# Patient Record
Sex: MALE | Race: WHITE | NOT HISPANIC OR LATINO | Employment: UNEMPLOYED | URBAN - METROPOLITAN AREA
[De-identification: names, ages, dates, MRNs, and addresses within clinical notes are randomized per-mention and may not be internally consistent; named-entity substitution may affect disease eponyms.]

---

## 2017-01-19 ENCOUNTER — OFFICE VISIT (OUTPATIENT)
Dept: FAMILY MEDICINE CLINIC | Facility: CLINIC | Age: 57
End: 2017-01-19

## 2017-01-19 VITALS
TEMPERATURE: 97.9 F | DIASTOLIC BLOOD PRESSURE: 70 MMHG | HEIGHT: 75 IN | SYSTOLIC BLOOD PRESSURE: 130 MMHG | WEIGHT: 295 LBS | OXYGEN SATURATION: 98 % | HEART RATE: 87 BPM | BODY MASS INDEX: 36.68 KG/M2

## 2017-01-19 DIAGNOSIS — E10.21 TYPE 1 DIABETES MELLITUS WITH NEPHROPATHY (HCC): ICD-10-CM

## 2017-01-19 DIAGNOSIS — J06.9 URI, ACUTE: ICD-10-CM

## 2017-01-19 DIAGNOSIS — R05.9 COUGH: Primary | ICD-10-CM

## 2017-01-19 DIAGNOSIS — J06.9 VIRAL UPPER RESPIRATORY INFECTION: ICD-10-CM

## 2017-01-19 DIAGNOSIS — D17.0 LIPOMA OF SCALP: ICD-10-CM

## 2017-01-19 LAB
EXPIRATION DATE: NORMAL
FLUAV AG NPH QL: NEGATIVE
FLUBV AG NPH QL: NEGATIVE
INTERNAL CONTROL: NORMAL
Lab: NORMAL

## 2017-01-19 PROCEDURE — 87804 INFLUENZA ASSAY W/OPTIC: CPT | Performed by: GENERAL PRACTICE

## 2017-01-19 PROCEDURE — 99213 OFFICE O/P EST LOW 20 MIN: CPT | Performed by: GENERAL PRACTICE

## 2017-01-19 NOTE — MR AVS SNAPSHOT
Gigi Chew   1/19/2017 3:40 PM   Office Visit    Dept Phone:  [phone]   Encounter #:  [id number]    Provider:  Oneal Mabry MD   Department:  [facility]                Your Full Care Plan              Your Updated Medication List          This list is accurate as of: 1/19/17  4:21 PM.  Always use your most recent med list.                albuterol 108 (90 BASE) MCG/ACT inhaler   Commonly known as:  PROVENTIL HFA;VENTOLIN HFA       atorvastatin 40 MG tablet   Commonly known as:  LIPITOR   Take 1 tablet (40 mg total) by mouth daily.       budesonide-formoterol 160-4.5 MCG/ACT inhaler   Commonly known as:  SYMBICORT       clopidogrel 75 MG tablet   Commonly known as:  PLAVIX   Take 1 tablet by mouth Daily.       glucose blood test strip       hydrochlorothiazide 12.5 MG capsule   Commonly known as:  MICROZIDE   Take 1 capsule by mouth Every Morning.       lisinopril 40 MG tablet   Commonly known as:  PRINIVIL,ZESTRIL   Take 1 tablet by mouth Daily.       NOVOLOG FLEXPEN 100 UNIT/ML solution pen-injector sc pen   Generic drug:  insulin aspart       TOUJEO SOLOSTAR 300 UNIT/ML solution pen-injector   Generic drug:  Insulin Glargine               We Performed the Following     POC Influenza A / B       You Were Diagnosed With        Codes Comments    Cough    -  Primary ICD-10-CM: R05  ICD-9-CM: 786.2     URI, acute     ICD-10-CM: J06.9  ICD-9-CM: 465.9       Instructions     None    Patient Instructions History      Upcoming Appointments     Visit Type Date Time Department    OFFICE VISIT 1/19/2017  3:40 PM K JIM MERLOS      [employer] Signup     [facility] [employer] allows you to send messages to your doctor, view your test results, renew your prescriptions, schedule appointments, and more. To sign up, go to [employer] and click on the Sign Up Now link in the New User? box. Enter your [employer] Activation Code exactly as it  "appears below along with the last four digits of your Social Security Number and your Date of Birth () to complete the sign-up process. If you do not sign up before the expiration date, you must request a new code.    [employer] Activation Code: [email or web address]  Expires: 2017  4:21 PM    If you have questions, you can email "[email or web address] or call [phone] to talk to our [employer] staff. Remember, [employer] is NOT to be used for urgent needs. For medical emergencies, dial 911.               Other Info from Your Visit           Allergies     No Known Allergies      Reason for Visit     Follow-up HTN Diabetic hpl     Cough     Anorexia     URI     Fatigue           Vital Signs     Blood Pressure Pulse Temperature Height Weight Oxygen Saturation    130/70 87 97.9 °F (36.6 °C) 75\" (190.5 cm) 295 lb (134 kg) 98%    Body Mass Index Smoking Status                36.87 kg/m2 Current Every Day Smoker          Problems and Diagnoses Noted     Cough    -  Primary    URI, acute          Results     POC Influenza A / B      Component Value Standard Range & Units    Rapid Influenza A Ag Negative     Rapid Influenza B Ag Negative     Internal Control Passed Passed    Lot Number [id number]     Expiration Date 2017                     "

## 2017-01-19 NOTE — PROGRESS NOTES
Subjective   Gigi Chew is a 56 y.o. male.     Chief Complaint   Patient presents with   • Follow-up     HTN Diabetic hpl    • Cough   • Anorexia   • URI   • Fatigue       History of Present Illness patient is a 56 y.o. very nice  gentleman known diabetic who sees diabetic specialist who is here today with a upper respiratory like illness started I will sore throat on the next day pretty much followed what we have been saying that has been a viral upper respiratory infection and we will check him for the flu.  He also has a lump on the back of his head that he says is been there for a long time we will take a look at this patient is on Plavix because of atrial fibrillation                        The following portions of the patient's history were reviewed and updated as appropriate: allergies, current medications, past family history, past medical history, past social history, past surgical history and problem list.      Review of Systems   Constitutional:        Gain and a diabetic there was a complaint by my MA of anorexia but trying to draw this history the patient I could not get this history he appears to be eating and obviously he is eating too much he has a weight gain 271-295   Respiratory:        As he is having cough coughing up clear sputum no blood no green   Cardiovascular:        Hyperlipidemia on medication per tension on medication   Gastrointestinal:        Patient has never had a colonoscopy anyone ever well he says he also refuses all vaccinations   Endocrine:        IV headache sees diabetic specialist         Objective   Physical Exam   HENT:   Throat is negative posterior drainage negative   Neck:   On examination of his neck cervical adenopathy I will question that I may have felt a nodule in the lower chain I felt this several times spent over 6 minutes or longer even feeling us area but it really couldn't feel what I felt right off the I told her gentleman I need to re-see him  I had an about 4 weeks to reexamine this.  The throat itself is perfectly normal there is no posterior drainage I believe his illness is viral   Cardiovascular: Normal rate, regular rhythm and normal heart sounds.    Pulmonary/Chest:   Lungs are clear   Musculoskeletal:   Posterior skull area there is what appears to be a half a centimeter by have centimeter round of lipoma this is been there quite a while it's soft it's movable it certainly feel like a lipoma         Assessment/Plan   Gigi was seen today for follow-up, cough, anorexia, uri and fatigue.    Diagnoses and all orders for this visit:    Cough  -     POC Influenza A / B    URI, acute  -     POC Influenza A / B    Type 1 diabetes mellitus with nephropathy    Viral upper respiratory infection    Lipoma of scalp              Oneal Mabry MD  1/19/2017    patient is a 56-year-old white  gentleman known diabetic who sees a specialist patient has what appears to be a lipoma the left skull area I do believe this is benign he has hyperlipidemia he is on medication this patient refuses a colonoscopy refuses all vaccinations such as flu and pneumonia and shingles.  Questions I will his right base of the cervical chain [place] I really could not produce this satisfactory for myself I have asked this patient to return return in about one month to let me recheck this neck his lungs were clear his flu test was negative at told him I felt all he needed was 90 histamine maybe some reason next he did not need an anabiotic.  If he did spike a temp 101 100 to please call me that would be a different story I did inform him that I was retiring the trying to help him find a physician at told him I would begin to look for physician out where you listen she lives so far [place]

## 2017-04-17 ENCOUNTER — OFFICE VISIT (OUTPATIENT)
Dept: INTERNAL MEDICINE | Facility: CLINIC | Age: 57
End: 2017-04-17

## 2017-04-17 VITALS
BODY MASS INDEX: 37.35 KG/M2 | RESPIRATION RATE: 16 BRPM | TEMPERATURE: 97.2 F | WEIGHT: 291 LBS | DIASTOLIC BLOOD PRESSURE: 70 MMHG | SYSTOLIC BLOOD PRESSURE: 142 MMHG | HEART RATE: 84 BPM | HEIGHT: 74 IN

## 2017-04-17 DIAGNOSIS — E78.2 MIXED HYPERLIPIDEMIA: ICD-10-CM

## 2017-04-17 DIAGNOSIS — E66.01 MORBID OBESITY DUE TO EXCESS CALORIES (HCC): ICD-10-CM

## 2017-04-17 DIAGNOSIS — E10.65 TYPE 1 DIABETES MELLITUS WITH HYPERGLYCEMIA (HCC): ICD-10-CM

## 2017-04-17 DIAGNOSIS — I10 ESSENTIAL HYPERTENSION: Primary | ICD-10-CM

## 2017-04-17 PROCEDURE — 99214 OFFICE O/P EST MOD 30 MIN: CPT | Performed by: FAMILY MEDICINE

## 2017-04-17 RX ORDER — ATORVASTATIN CALCIUM 40 MG/1
40 TABLET, FILM COATED ORAL DAILY
Qty: 30 TABLET | Refills: 5 | Status: SHIPPED | OUTPATIENT
Start: 2017-04-17

## 2017-04-17 RX ORDER — LISINOPRIL 40 MG/1
40 TABLET ORAL DAILY
Qty: 30 TABLET | Refills: 5 | Status: SHIPPED | OUTPATIENT
Start: 2017-04-17

## 2017-04-17 RX ORDER — PHENTERMINE HYDROCHLORIDE 37.5 MG/1
37.5 CAPSULE ORAL EVERY MORNING
Qty: 30 CAPSULE | Refills: 2 | Status: SHIPPED | OUTPATIENT
Start: 2017-04-17

## 2017-04-17 RX ORDER — HYDROCHLOROTHIAZIDE 12.5 MG/1
12.5 CAPSULE, GELATIN COATED ORAL EVERY MORNING
Qty: 30 CAPSULE | Refills: 5 | Status: SHIPPED | OUTPATIENT
Start: 2017-04-17

## 2017-04-17 RX ORDER — INSULIN DEGLUDEC 200 U/ML
30 INJECTION, SOLUTION SUBCUTANEOUS DAILY
COMMUNITY
Start: 2017-04-07

## 2017-04-17 RX ORDER — CLOPIDOGREL BISULFATE 75 MG/1
75 TABLET ORAL DAILY
Qty: 30 TABLET | Refills: 5 | Status: SHIPPED | OUTPATIENT
Start: 2017-04-17

## 2017-04-17 RX ORDER — HYDROCHLOROTHIAZIDE 12.5 MG/1
12.5 CAPSULE, GELATIN COATED ORAL EVERY MORNING
Qty: 30 CAPSULE | Refills: 5 | Status: SHIPPED | OUTPATIENT
Start: 2017-04-17 | End: 2017-04-17 | Stop reason: SDUPTHER

## 2017-04-17 RX ORDER — LISINOPRIL 40 MG/1
40 TABLET ORAL DAILY
Qty: 30 TABLET | Refills: 5 | Status: SHIPPED | OUTPATIENT
Start: 2017-04-17 | End: 2017-04-17 | Stop reason: SDUPTHER

## 2017-04-17 NOTE — PROGRESS NOTES
Subjective   Gigi Chew is a 56 y.o. male.     Chief Complaint   Patient presents with   • Diabetes   • Hypertension   • Hyperlipidemia   • Obesity         History of Present Illness   Patient is here as a transfer from Dr. Mabry's practice who is retired.  He is followed by endocrinology for type 1 diabetes but has a problem with obesity increasing weight gain because he sits at home and eats and stays up late at night.    Otherwise he does smoke and we discussed smoking cessation briefly.  He sees pulmonary and also is treated for hypertension as well as hyperlipidemia.  He is on Plavix prophylactically.      The following portions of the patient's history were reviewed and updated as appropriate: allergies, current medications, past social history and problem list.    Review of Systems   Constitutional: Positive for unexpected weight change.   HENT: Negative.    Eyes: Negative.    Respiratory: Negative.    Cardiovascular: Negative.    Gastrointestinal: Negative.    Endocrine: Negative.    Genitourinary: Negative.    Musculoskeletal: Negative.    Skin: Negative.    Allergic/Immunologic: Negative.    Neurological: Negative.    Hematological: Negative.    Psychiatric/Behavioral: Negative.        Objective   Vitals:    04/17/17 1620   BP: 142/70   Pulse: 84   Resp: 16   Temp: 97.2 °F (36.2 °C)     Physical Exam   Constitutional: He is oriented to person, place, and time. He appears well-developed and well-nourished.   Patient is pleasant and a large man who is overweight.  He has multiple tattoos and the arms and chest.   HENT:   Head: Normocephalic and atraumatic.   Right Ear: Tympanic membrane and external ear normal.   Left Ear: Tympanic membrane and external ear normal.   Nose: Nose normal.   Mouth/Throat: Oropharynx is clear and moist.   Eyes: Conjunctivae and EOM are normal. Pupils are equal, round, and reactive to light.   Neck: Normal range of motion. Neck supple. No JVD present. No thyromegaly present.    Cardiovascular: Normal rate, regular rhythm, normal heart sounds and intact distal pulses.    Pulmonary/Chest: Effort normal and breath sounds normal.   Abdominal: Soft. Bowel sounds are normal.   Musculoskeletal: Normal range of motion.   Lymphadenopathy:     He has no cervical adenopathy.   Neurological: He is alert and oriented to person, place, and time. No cranial nerve deficit. Coordination normal.   Skin: Skin is warm and dry. No rash noted.   Psychiatric: He has a normal mood and affect. His behavior is normal. Judgment and thought content normal.   Vitals reviewed.      Assessment/Plan   Problem List Items Addressed This Visit        Cardiovascular and Mediastinum    Hypertension - Primary    Relevant Medications    hydrochlorothiazide (MICROZIDE) 12.5 MG capsule    lisinopril (PRINIVIL,ZESTRIL) 40 MG tablet       Other    Type 1 diabetes mellitus with hyperglycemia      Other Visit Diagnoses     Morbid obesity due to excess calories        Mixed hyperlipidemia        Relevant Medications    atorvastatin (LIPITOR) 40 MG tablet      Plan: Discussed phentermine 37.5 mg daily.  #30 refill times to control substance treatment given other medications are refilled.

## 2017-11-13 ENCOUNTER — APPOINTMENT (OUTPATIENT)
Dept: OTHER | Facility: HOSPITAL | Age: 57
End: 2017-11-13

## 2017-12-05 ENCOUNTER — HOSPITAL ENCOUNTER (OUTPATIENT)
Dept: PET IMAGING | Facility: HOSPITAL | Age: 57
Discharge: HOME | End: 2017-12-05
Admitting: NURSE PRACTITIONER

## 2017-12-05 ENCOUNTER — CLINICAL SUPPORT (OUTPATIENT)
Dept: OTHER | Facility: HOSPITAL | Age: 57
End: 2017-12-05

## 2017-12-05 VITALS
RESPIRATION RATE: 18 BRPM | WEIGHT: 289 LBS | OXYGEN SATURATION: 96 % | SYSTOLIC BLOOD PRESSURE: 142 MMHG | HEIGHT: 74 IN | HEART RATE: 77 BPM | BODY MASS INDEX: 37.09 KG/M2 | TEMPERATURE: 97.1 F | DIASTOLIC BLOOD PRESSURE: 75 MMHG

## 2017-12-05 DIAGNOSIS — F17.210 SMOKING GREATER THAN 30 PACK YEARS: ICD-10-CM

## 2017-12-05 DIAGNOSIS — Z12.2 ENCOUNTER FOR SCREENING FOR LUNG CANCER: ICD-10-CM

## 2017-12-05 DIAGNOSIS — Z12.2 ENCOUNTER FOR SCREENING FOR LUNG CANCER: Primary | ICD-10-CM

## 2017-12-05 PROCEDURE — G0463 HOSPITAL OUTPT CLINIC VISIT: HCPCS | Performed by: NURSE PRACTITIONER

## 2017-12-05 PROCEDURE — G0296 VISIT TO DETERM LDCT ELIG: HCPCS | Performed by: NURSE PRACTITIONER

## 2017-12-05 PROCEDURE — G0297 LDCT FOR LUNG CA SCREEN: HCPCS

## 2017-12-05 RX ORDER — DOXYCYCLINE 100 MG/1
CAPSULE ORAL
COMMUNITY
Start: 2017-11-22

## 2017-12-05 RX ORDER — FLUTICASONE PROPIONATE AND SALMETEROL XINAFOATE 115; 21 UG/1; UG/1
AEROSOL, METERED RESPIRATORY (INHALATION)
COMMUNITY
Start: 2017-10-26

## 2017-12-05 NOTE — PROGRESS NOTES
[facility]   LOW-DOSE LUNG CT SCREENING VISIT    Gigi Chew is a pleasant 57 y.o.   male seen today at the request of Jayesh Mitchell MD in our Multidisciplinary Clinic. Here today for a shared decision making visit prior to Low-Dose CT Lung Cancer Screening.    HPI:   57 y.o. male who today reports a 60 pack year history.   his family history includes Alcohol abuse in his other; Dementia in his mother; Diabetes in his other; Hypertension in his other; Other in his other.     Past Medical History:   Diagnosis Date   • Chronic obstructive asthma    • COPD (chronic obstructive pulmonary disease)    • Diabetes mellitus    • Hyperlipidemia    • Hypertension      Past Surgical History:   Procedure Laterality Date   • ANKLE TENDON REPAIR Right        SOCIAL HISTORY:  he  reports that he has been smoking Cigarettes.  He started smoking about 40 years ago. He has a 60.00 pack-year smoking history. He has never used smokeless tobacco. He reports that he drinks alcohol. He reports that he does not use illicit drugs.. Usually smokes first cigarette less than 30 minutes after waking in the morning.     Patient Denies current second hand smoke exposure. Patient currently lives with his wife who is a non-smoker.    Patient does not have a basement/current risk for radon exposure. Provided patient with written information for the [place] Radon Program and free testing process. Patient Denies prior asbestos exposure. he has been employed as an  and  - negative for known environmental exposures.    Allergies as of 12/05/2017   • (No Known Allergies)       MEDICATIONS:  I have reviewed the medication list with the patient and updated it in the electronic medical record. Medication dosages and frequencies were confirmed to be accurate with the patient.    Review of Systems   Constitutional: Negative for activity change, appetite change, chills, fatigue, fever and unexpected weight  "change.   HENT: Negative for sore throat and trouble swallowing.         Denies hemoptysis     Respiratory: Positive for cough and shortness of breath. Negative for chest tightness.         Non-productive cough; dyspnea when climbing stairs- resolves quickly with rest   Cardiovascular: Negative for chest pain and leg swelling.   Gastrointestinal: Negative for blood in stool, constipation and diarrhea.   Genitourinary: Negative for difficulty urinating and hematuria.   Musculoskeletal: Negative for arthralgias.   Neurological: Negative for dizziness, weakness, light-headedness and headaches.   Psychiatric/Behavioral: Negative for sleep disturbance.       /75  Pulse 77  Temp 97.1 °F (36.2 °C) (Oral)   Resp 18  Ht 188 cm (74\")  Wt 131 kg (289 lb)  SpO2 96% Comment: room air  BMI 37.11 kg/m2    Pain Score    12/05/17 1724   PainSc: 0-No pain        Physical Exam   Constitutional: He is oriented to person, place, and time. Vital signs are normal. He appears well-developed and well-nourished. He is cooperative.   HENT:   Head: Normocephalic and atraumatic.   Mouth/Throat: Oropharynx is clear and moist and mucous membranes are normal.   Cardiovascular: Normal rate, regular rhythm, normal heart sounds and intact distal pulses.    No lower extremity edema     Pulmonary/Chest: Effort normal and breath sounds normal. No respiratory distress. He has no wheezes. He has no rhonchi. He has no rales.   Abdominal: Soft. Normal appearance and bowel sounds are normal. There is no tenderness.   Neurological: He is alert and oriented to person, place, and time.   Skin: Skin is warm, dry and intact. No cyanosis. Nails show clubbing.   Psychiatric: He has a normal mood and affect. His speech is normal and behavior is normal. Judgment and thought content normal.         DISCUSSION HELD TODAY:     Smoking cessation counseling: I advised the patient of the risks in continuing to use tobacco. In addition to cardiovascular risk " there is an increased risk for cancers of the oral cavity and pharynx, larynx, lung, esophagus, pancreas, uterine, cervix, kidney, bladder, stomach, colon, rectum and liver as well as myeloid leukemia. We also discussed that this risk may also extend to development of female breast cancer and advanced stage prostate cancer.     Patient has quit smoking once in the past for about two years while he was incarcerated. He states it wasn't difficult to remain smoke free because he was prepared mentally to, however he started smoking again immediately upon his release. Patient verbalized they are not currently motivated to quit smoking and they are not ready to quit smoking within the next 30 days.  Patient is confident in ability to quit smoking when he does try again. Patient is not willing to set a quit date.     We discussed a number of resources to assist with smoking cessation including the 1-800-Quit Now line, Health Department programs, [place] Cancer Program, and online tools.     We discussed evidence based approaches to quit smoking including options for pharmacotherapy, nicotine replacement therapy, and supportive counseling in group, individual or phone/web based settings. Explained that multiple attempts to quit are often needed before patients can achieve prolonged periods of time free from tobacco. Discussed that counseling or medications used alone are effective but effectiveness is increased when both methods are used.    Referral offered to Quit Now [place] for smoking cessation counseling. Patient declines today. Encouraged patient to refer to materials provided or call if further smoking cessation resources or info desired.     Shared decision making for lung cancer screening: We discussed the NCCN guidelines for lung cancer screening and the patient verbalized understanding that annual screening is recommended for all persons between ages 55 and 80, with at least a 30 pack year smoking history  until fifteen years beyond smoking. The Lung Cancer Screening Shared Decision-Making Tool was utilized for review. We discussed possible benefits of screening include reduced risk of dying from lung cancer, potential for increased success of treatment, early detection, and possibility of more treatment options if cancer is detected. Also discussed possible harms of screening including false positive result leading to invasive procedures or biopsies with their own risks for complications, as well as over diagnosis or identification of slow growing cancers that would not lead to illness or death.     Based on this discussion the patient has decided to proceed with a Low Dose Lung Cancer Screening CT today. The patient requests that the results of his screening be shared with his PCP as well.       The patient verbalizes understanding that we will call with results of this low dose lung CT exam and that assistance will be provided in arranging any necessary follow-up. After review of the NCCN guidelines and recommendations for ongoing screening, the patient verbalized understanding of recommendations for follow-up.     Orders Placed This Encounter   Procedures   • CT Chest Low Dose Wo     Standing Status:   Future     Number of Occurrences:   1     Standing Expiration Date:   12/5/2017     Scheduling Instructions:      Anuj Morataya #[id number]     Order Specific Question:   Reason for Exam:     Answer:   Lung cancer screening; > 30 pack year history       20 minutes out of 30 minutes face-to-face spent counseling patient on risks and benefits of lung cancer screening, smoking history, impact of tobacco use on cancer risk and importance of tobacco/smoking cessation and abstinence     Lindy Mayers DNP, APRN, AGCNS-BC, AOCNS  Survivorship Clinical Nurse Specialist  [facility]

## 2018-02-08 RX ORDER — ATORVASTATIN CALCIUM 40 MG/1
TABLET, FILM COATED ORAL
Qty: 90 TABLET | Refills: 0 | OUTPATIENT
Start: 2018-02-08

## 2018-12-04 ENCOUNTER — TRANSCRIBE ORDERS (OUTPATIENT)
Dept: ADMINISTRATIVE | Facility: HOSPITAL | Age: 58
End: 2018-12-04

## 2018-12-04 DIAGNOSIS — Z12.2 ENCOUNTER FOR SCREENING FOR LUNG CANCER: Primary | ICD-10-CM

## 2018-12-04 DIAGNOSIS — F17.218 CIGARETTE NICOTINE DEPENDENCE WITH OTHER NICOTINE-INDUCED DISORDER: ICD-10-CM

## 2021-03-19 ENCOUNTER — BULK ORDERING (OUTPATIENT)
Dept: CASE MANAGEMENT | Facility: OTHER | Age: 61
End: 2021-03-19

## 2021-03-19 DIAGNOSIS — Z23 IMMUNIZATION DUE: ICD-10-CM

## 2024-05-07 ENCOUNTER — TRANSCRIBE ORDERS (OUTPATIENT)
Dept: ADMINISTRATIVE | Facility: HOSPITAL | Age: 64
End: 2024-05-07
Payer: MEDICARE

## 2024-05-07 DIAGNOSIS — R93.89 ABNORMAL X-RAY: Primary | ICD-10-CM

## 2024-06-05 ENCOUNTER — HOSPITAL ENCOUNTER (OUTPATIENT)
Dept: PET IMAGING | Facility: HOSPITAL | Age: 64
Discharge: HOME | End: 2024-06-05
Admitting: INTERNAL MEDICINE
Payer: MEDICARE

## 2024-06-05 DIAGNOSIS — R93.89 ABNORMAL X-RAY: ICD-10-CM

## 2024-06-05 PROCEDURE — 71250 CT THORAX DX C-: CPT

## 2025-08-26 ENCOUNTER — TRANSCRIBE ORDERS (OUTPATIENT)
Dept: ADMINISTRATIVE | Facility: HOSPITAL | Age: 65
End: 2025-08-26
Payer: MEDICARE

## 2025-08-26 DIAGNOSIS — R93.89 ABNORMAL CHEST CT: ICD-10-CM

## 2025-08-26 DIAGNOSIS — R91.8 MULTIPLE LUNG NODULES: Primary | ICD-10-CM
